# Patient Record
(demographics unavailable — no encounter records)

---

## 2025-05-27 NOTE — ASSESSMENT
[FreeTextEntry1] : A/P:  The patient is a very pleasant 34-year-old man with a symptomatic right inguinal hernia.  We discussed options for ongoing observation versus repair.  We discussed the etiology and natural course of inguinal hernias.  We discussed different techniques for repair including laparoscopic robotic assisted and open repair, both of which would be with mesh. After reviewing the risks and benefits he does wish to move forward with surgery.  We reviewed the typical risks and benefits specifically of laparoscopic robotic assisted inguinal hernia repair with mesh including but not limited to bleeding infection recurrence and injury to nearby structures as well as nerve pain.  We discussed the typical perioperative and postop course and time off needed from work.  We also reviewed the warning signs that would indicate need for more urgent reevaluation.  All questions were answered and he will be scheduled at his convenience.

## 2025-05-27 NOTE — HISTORY OF PRESENT ILLNESS
[de-identified] : The patient is a very pleasant 34-year-old man who presents for evaluation of a right sided groin bulge.  He reports it has been present for roughly 6 months.  He does not recall a specific episode of heavy lifting or inciting factor but it has gradually become more obvious to him.  He has a very physical job as an EMT.  He denies any prior abdominal surgery.  He has been able to continue his normal activities for now but is bothered by the discomfort.  When he lies down it does go back in and then as soon as he stands up it comes back out.  He denies any abdominal distention nausea vomiting or change in bowel habits.  He denies any urinary symptoms or urinary frequency.  He is otherwise in his usual state of health.

## 2025-05-27 NOTE — PHYSICAL EXAM
[JVD] : no jugular venous distention  [Respiratory Effort] : normal respiratory effort [Normal Rate and Rhythm] : normal rate and rhythm [No Rash or Lesion] : No rash or lesion [Alert] : alert [Oriented to Person] : oriented to person [Oriented to Place] : oriented to place [Oriented to Time] : oriented to time [Calm] : calm [de-identified] : well NAD [de-identified] : NCAT [de-identified] : Soft nondistended.  There is a tiny umbilical hernia that is reduced at baseline there is a right inguinal hernia that is easily reducible.  No skin changes or warmth.  No obvious hernia on exam on the left. [de-identified] : no c/c/e

## 2025-05-27 NOTE — REVIEW OF SYSTEMS
[TextEntry] :  Constitutional, Eyes, ENT, Cardiovascular, Respiratory, Gastrointestinal, Genitourinary, Musculoskeletal, Integumentary, Neurological, Psychiatric, Endocrine and Heme/Lymph are otherwise negative.

## 2025-06-24 NOTE — HISTORY OF PRESENT ILLNESS
[No Pertinent Cardiac History] : no history of aortic stenosis, atrial fibrillation, coronary artery disease, recent myocardial infarction, or implantable device/pacemaker [No Pertinent Pulmonary History] : no history of asthma, COPD, sleep apnea, or smoking [(Patient denies any chest pain, claudication, dyspnea on exertion, orthopnea, palpitations or syncope)] : Patient denies any chest pain, claudication, dyspnea on exertion, orthopnea, palpitations or syncope [Excellent (>10 METs)] : Excellent (>10 METs) [Self] : no previous adverse anesthesia reaction [Chronic Anticoagulation] : no chronic anticoagulation [Chronic Kidney Disease] : no chronic kidney disease [Diabetes] : no diabetes [FreeTextEntry2] : 6/30/25 [FreeTextEntry1] : ROBOTIC ASSISTED LAPAROSCOPIC RIGHT INGUINAL HERNIA REPAIR WITH MESH [FreeTextEntry3] : Dr. Jackson [FreeTextEntry4] : Mr. LYNDSEY MULLER is a 34-year-old male with history of inguinal hernia presenting today to the [facility] prior to inguinal hernia repair. 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

## 2025-06-24 NOTE — HISTORY OF PRESENT ILLNESS
[No Pertinent Cardiac History] : no history of aortic stenosis, atrial fibrillation, coronary artery disease, recent myocardial infarction, or implantable device/pacemaker [No Pertinent Pulmonary History] : no history of asthma, COPD, sleep apnea, or smoking [(Patient denies any chest pain, claudication, dyspnea on exertion, orthopnea, palpitations or syncope)] : Patient denies any chest pain, claudication, dyspnea on exertion, orthopnea, palpitations or syncope [Excellent (>10 METs)] : Excellent (>10 METs) [Self] : no previous adverse anesthesia reaction [Chronic Anticoagulation] : no chronic anticoagulation [Chronic Kidney Disease] : no chronic kidney disease [Diabetes] : no diabetes [FreeTextEntry2] : 6/30/25 [FreeTextEntry1] : ROBOTIC ASSISTED LAPAROSCOPIC RIGHT INGUINAL HERNIA REPAIR WITH MESH [FreeTextEntry3] : Dr. Jackosn [FreeTextEntry4] : Mr. LYNDSEY MULLER is a 34-year-old male with history of inguinal hernia presenting today to the [facility] prior to inguinal hernia repair. 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

## 2025-06-24 NOTE — ASSESSMENT
[Patient Optimized for Surgery] : Patient optimized for surgery [FreeTextEntry4] : Mr. LYNDSEY MULLER is a 34-year-old male with history of inguinal hernia presenting today to the [facility] prior to inguinal hernia repair. He is able to achieve >4 METs and no anginal symptoms. He is considered low risk for a low/intermediate risk procedure.

## 2025-06-24 NOTE — ASSESSMENT
[Patient Optimized for Surgery] : Patient optimized for surgery [FreeTextEntry4] : Mr. LYNDSEY MULLER is a 34-year-old male with history of inguinal hernia presenting today to the [facility] prior to inguinal hernia repair. He is able to achieve >4 METs and no anginal symptoms. He is considered low risk for a low/intermediate risk procedure.

## 2025-06-24 NOTE — ASSESSMENT
[Patient Optimized for Surgery] : Patient optimized for surgery [FreeTextEntry4] : Mr. LYNDSEY MULLER is a 34-year-old male with history of inguinal hernia presenting today to the [facility] prior to inguinal hernia repair. He is able to achieve >4 METs and no anginal symptoms. He is considered low risk for a low/intermediate risk procedure.

## 2025-06-24 NOTE — HISTORY OF PRESENT ILLNESS
[No Pertinent Cardiac History] : no history of aortic stenosis, atrial fibrillation, coronary artery disease, recent myocardial infarction, or implantable device/pacemaker [No Pertinent Pulmonary History] : no history of asthma, COPD, sleep apnea, or smoking [(Patient denies any chest pain, claudication, dyspnea on exertion, orthopnea, palpitations or syncope)] : Patient denies any chest pain, claudication, dyspnea on exertion, orthopnea, palpitations or syncope [Excellent (>10 METs)] : Excellent (>10 METs) [Self] : no previous adverse anesthesia reaction [Chronic Anticoagulation] : no chronic anticoagulation [Chronic Kidney Disease] : no chronic kidney disease [Diabetes] : no diabetes [FreeTextEntry1] : ROBOTIC ASSISTED LAPAROSCOPIC RIGHT INGUINAL HERNIA REPAIR WITH MESH [FreeTextEntry2] : 6/30/25 [FreeTextEntry3] : Dr. Jackson [FreeTextEntry4] : Mr. LYNDSEY MULLER is a 34-year-old male with history of inguinal hernia presenting today to the [facility] prior to inguinal hernia repair. 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